# Patient Record
Sex: MALE | Race: WHITE | URBAN - METROPOLITAN AREA
[De-identification: names, ages, dates, MRNs, and addresses within clinical notes are randomized per-mention and may not be internally consistent; named-entity substitution may affect disease eponyms.]

---

## 2017-02-08 ENCOUNTER — HISTORICAL (OUTPATIENT)
Dept: ADMINISTRATIVE | Facility: HOSPITAL | Age: 22
End: 2017-02-08

## 2017-04-07 ENCOUNTER — HISTORICAL (OUTPATIENT)
Dept: SURGERY | Facility: HOSPITAL | Age: 22
End: 2017-04-07

## 2017-07-13 ENCOUNTER — HISTORICAL (OUTPATIENT)
Dept: SURGERY | Facility: HOSPITAL | Age: 22
End: 2017-07-13

## 2020-07-10 ENCOUNTER — HISTORICAL (OUTPATIENT)
Dept: ADMINISTRATIVE | Facility: HOSPITAL | Age: 25
End: 2020-07-10

## 2020-07-13 ENCOUNTER — HISTORICAL (OUTPATIENT)
Dept: ADMINISTRATIVE | Facility: HOSPITAL | Age: 25
End: 2020-07-13

## 2020-07-16 ENCOUNTER — HISTORICAL (OUTPATIENT)
Dept: SURGERY | Facility: HOSPITAL | Age: 25
End: 2020-07-16

## 2020-07-29 ENCOUNTER — HISTORICAL (OUTPATIENT)
Dept: ADMINISTRATIVE | Facility: HOSPITAL | Age: 25
End: 2020-07-29

## 2020-08-12 ENCOUNTER — HISTORICAL (OUTPATIENT)
Dept: ADMINISTRATIVE | Facility: HOSPITAL | Age: 25
End: 2020-08-12

## 2020-09-21 ENCOUNTER — HISTORICAL (OUTPATIENT)
Dept: ADMINISTRATIVE | Facility: HOSPITAL | Age: 25
End: 2020-09-21

## 2020-09-21 LAB
ABS NEUT (OLG): 2.02 X10(3)/MCL (ref 2.1–9.2)
ALBUMIN SERPL-MCNC: 4 GM/DL (ref 3.4–5)
ALBUMIN/GLOB SERPL: 1.2 RATIO (ref 1.1–2)
ALP SERPL-CCNC: 82 UNIT/L (ref 45–117)
ALT SERPL-CCNC: 35 UNIT/L (ref 12–78)
AST SERPL-CCNC: 17 UNIT/L (ref 15–37)
BASOPHILS # BLD AUTO: 0 X10(3)/MCL (ref 0–0.2)
BASOPHILS NFR BLD AUTO: 1 %
BILIRUB SERPL-MCNC: 0.4 MG/DL (ref 0.2–1)
BILIRUBIN DIRECT+TOT PNL SERPL-MCNC: 0.1 MG/DL (ref 0–0.2)
BILIRUBIN DIRECT+TOT PNL SERPL-MCNC: 0.3 MG/DL
BUN SERPL-MCNC: 15 MG/DL (ref 7–18)
CALCIUM SERPL-MCNC: 8.9 MG/DL (ref 8.5–10.1)
CHLORIDE SERPL-SCNC: 109 MMOL/L (ref 98–107)
CHOLEST SERPL-MCNC: 173 MG/DL
CHOLEST/HDLC SERPL: 3.2 {RATIO} (ref 0–5)
CO2 SERPL-SCNC: 28 MMOL/L (ref 21–32)
CREAT SERPL-MCNC: 0.9 MG/DL (ref 0.6–1.3)
EOSINOPHIL # BLD AUTO: 0.1 X10(3)/MCL (ref 0–0.9)
EOSINOPHIL NFR BLD AUTO: 2 %
ERYTHROCYTE [DISTWIDTH] IN BLOOD BY AUTOMATED COUNT: 12.1 % (ref 11.5–14.5)
EST. AVERAGE GLUCOSE BLD GHB EST-MCNC: 140 MG/DL
ESTRADIOL SERPL HS-MCNC: <24 PG/ML
FSH SERPL-ACNC: 2.5 MIU/ML (ref 1.5–15)
GLOBULIN SER-MCNC: 3.3 GM/ML (ref 2.3–3.5)
GLUCOSE SERPL-MCNC: 88 MG/DL (ref 74–100)
HBA1C MFR BLD: 6.5 % (ref 4.2–6.3)
HCT VFR BLD AUTO: 45.8 % (ref 40–51)
HDLC SERPL-MCNC: 54 MG/DL (ref 40–59)
HGB BLD-MCNC: 15.2 GM/DL (ref 13.5–17.5)
LDLC SERPL CALC-MCNC: 102 MG/DL
LH SERPL-ACNC: 4.2 MIU/ML (ref 1.2–10.6)
LYMPHOCYTES # BLD AUTO: 1.3 X10(3)/MCL (ref 0.6–4.6)
LYMPHOCYTES NFR BLD AUTO: 35 %
MCH RBC QN AUTO: 32.2 PG (ref 26–34)
MCHC RBC AUTO-ENTMCNC: 33.2 GM/DL (ref 31–37)
MCV RBC AUTO: 97 FL (ref 80–100)
MONOCYTES # BLD AUTO: 0.3 X10(3)/MCL (ref 0.1–1.3)
MONOCYTES NFR BLD AUTO: 9 %
NEUTROPHILS # BLD AUTO: 2.02 X10(3)/MCL (ref 2.1–9.2)
NEUTROPHILS NFR BLD AUTO: 53 %
PLATELET # BLD AUTO: 181 X10(3)/MCL (ref 130–400)
PMV BLD AUTO: 11.8 FL (ref 7.4–10.4)
POTASSIUM SERPL-SCNC: 4 MMOL/L (ref 3.5–5.1)
PROT SERPL-MCNC: 7.3 GM/DL (ref 6.4–8.2)
PSA SERPL-MCNC: 1.1 NG/ML
RBC # BLD AUTO: 4.72 X10(6)/MCL (ref 4.5–5.9)
SODIUM SERPL-SCNC: 143 MMOL/L (ref 136–145)
TESTOST SERPL-MCNC: 312.69 NG/DL (ref 240.24–870.68)
TRIGL SERPL-MCNC: 83 MG/DL
TSH SERPL-ACNC: 2.03 MIU/L (ref 0.36–3.74)
VLDLC SERPL CALC-MCNC: 17 MG/DL
WBC # SPEC AUTO: 3.8 X10(3)/MCL (ref 4.5–11)

## 2020-09-23 ENCOUNTER — HISTORICAL (OUTPATIENT)
Dept: ADMINISTRATIVE | Facility: HOSPITAL | Age: 25
End: 2020-09-23

## 2020-09-28 ENCOUNTER — HISTORICAL (OUTPATIENT)
Dept: FAMILY MEDICINE | Facility: CLINIC | Age: 25
End: 2020-09-28

## 2020-09-28 LAB — CORTIS SERPL-SCNC: 7.6 UG/DL

## 2020-10-13 ENCOUNTER — HISTORICAL (OUTPATIENT)
Dept: ADMINISTRATIVE | Facility: HOSPITAL | Age: 25
End: 2020-10-13

## 2020-10-13 LAB
BUN SERPL-MCNC: 13 MG/DL (ref 7–18)
CALCIUM SERPL-MCNC: 9.1 MG/DL (ref 8.5–10.1)
CHLORIDE SERPL-SCNC: 106 MMOL/L (ref 98–107)
CO2 SERPL-SCNC: 29 MMOL/L (ref 21–32)
CREAT SERPL-MCNC: 0.9 MG/DL (ref 0.6–1.3)
CREAT/UREA NIT SERPL: 14 MG/DL (ref 12–14)
GLUCOSE SERPL-MCNC: 81 MG/DL (ref 74–106)
POTASSIUM SERPL-SCNC: 3.8 MMOL/L (ref 3.5–5.1)
SODIUM SERPL-SCNC: 141 MMOL/L (ref 136–145)
T4 FREE SERPL-MCNC: 0.98 NG/DL (ref 0.76–1.46)
TESTOST SERPL-MCNC: 278.68 NG/DL (ref 240.24–870.68)
TSH SERPL-ACNC: 1.4 MIU/L (ref 0.36–3.74)

## 2021-01-27 ENCOUNTER — HISTORICAL (OUTPATIENT)
Dept: ADMINISTRATIVE | Facility: HOSPITAL | Age: 26
End: 2021-01-27

## 2021-01-27 LAB — SARS-COV-2 RNA RESP QL NAA+PROBE: NOT DETECTED

## 2021-01-29 ENCOUNTER — HISTORICAL (OUTPATIENT)
Dept: SURGERY | Facility: HOSPITAL | Age: 26
End: 2021-01-29

## 2021-03-02 ENCOUNTER — HISTORICAL (OUTPATIENT)
Dept: ENDOCRINOLOGY | Facility: CLINIC | Age: 26
End: 2021-03-02

## 2021-03-02 LAB
ABS NEUT (OLG): 2.89 X10(3)/MCL (ref 2.1–9.2)
ALBUMIN SERPL-MCNC: 4.3 GM/DL (ref 3.5–5)
ALBUMIN/GLOB SERPL: 1.5 RATIO (ref 1.1–2)
ALP SERPL-CCNC: 94 UNIT/L (ref 40–150)
ALT SERPL-CCNC: 29 UNIT/L (ref 0–55)
AST SERPL-CCNC: 20 UNIT/L (ref 5–34)
BASOPHILS # BLD AUTO: 0 X10(3)/MCL (ref 0–0.2)
BASOPHILS NFR BLD AUTO: 1 %
BILIRUB SERPL-MCNC: 0.7 MG/DL
BILIRUBIN DIRECT+TOT PNL SERPL-MCNC: 0.2 MG/DL (ref 0–0.5)
BILIRUBIN DIRECT+TOT PNL SERPL-MCNC: 0.5 MG/DL (ref 0–0.8)
BUN SERPL-MCNC: 12.3 MG/DL (ref 8.9–20.6)
CALCIUM SERPL-MCNC: 9 MG/DL (ref 8.4–10.2)
CHLORIDE SERPL-SCNC: 104 MMOL/L (ref 98–107)
CHOLEST SERPL-MCNC: 190 MG/DL
CHOLEST/HDLC SERPL: 4 {RATIO} (ref 0–5)
CO2 SERPL-SCNC: 31 MMOL/L (ref 22–29)
CREAT SERPL-MCNC: 1.07 MG/DL (ref 0.73–1.18)
EOSINOPHIL # BLD AUTO: 0.1 X10(3)/MCL (ref 0–0.9)
EOSINOPHIL NFR BLD AUTO: 2 %
ERYTHROCYTE [DISTWIDTH] IN BLOOD BY AUTOMATED COUNT: 12.7 % (ref 11.5–14.5)
EST. AVERAGE GLUCOSE BLD GHB EST-MCNC: 88.2 MG/DL
GLOBULIN SER-MCNC: 2.8 GM/DL (ref 2.4–3.5)
GLUCOSE SERPL-MCNC: 85 MG/DL (ref 74–100)
HAV IGM SERPL QL IA: NONREACTIVE
HBA1C MFR BLD: 4.7 %
HBV CORE IGM SERPL QL IA: NONREACTIVE
HBV SURFACE AG SERPL QL IA: NONREACTIVE
HCT VFR BLD AUTO: 48.3 % (ref 40–51)
HCV AB SERPL QL IA: NONREACTIVE
HDLC SERPL-MCNC: 49 MG/DL (ref 35–60)
HGB BLD-MCNC: 15.6 GM/DL (ref 13.5–17.5)
HIV 1+2 AB+HIV1 P24 AG SERPL QL IA: NONREACTIVE
IMM GRANULOCYTES # BLD AUTO: 0.01 10*3/UL
IMM GRANULOCYTES NFR BLD AUTO: 0 %
LDLC SERPL CALC-MCNC: 126 MG/DL (ref 50–140)
LYMPHOCYTES # BLD AUTO: 1 X10(3)/MCL (ref 0.6–4.6)
LYMPHOCYTES NFR BLD AUTO: 24 %
MCH RBC QN AUTO: 31.1 PG (ref 26–34)
MCHC RBC AUTO-ENTMCNC: 32.3 GM/DL (ref 31–37)
MCV RBC AUTO: 96.4 FL (ref 80–100)
MONOCYTES # BLD AUTO: 0.4 X10(3)/MCL (ref 0.1–1.3)
MONOCYTES NFR BLD AUTO: 9 %
NEUTROPHILS # BLD AUTO: 2.89 X10(3)/MCL (ref 2.1–9.2)
NEUTROPHILS NFR BLD AUTO: 65 %
PLATELET # BLD AUTO: 188 X10(3)/MCL (ref 130–400)
PMV BLD AUTO: 11.3 FL (ref 7.4–10.4)
POTASSIUM SERPL-SCNC: 4.5 MMOL/L (ref 3.5–5.1)
PROT SERPL-MCNC: 7.1 GM/DL (ref 6.4–8.3)
RBC # BLD AUTO: 5.01 X10(6)/MCL (ref 4.5–5.9)
SODIUM SERPL-SCNC: 141 MMOL/L (ref 136–145)
T PALLIDUM AB SER QL: NONREACTIVE
TESTOST SERPL-MCNC: >1500 NG/DL (ref 240.24–870.68)
TRIGL SERPL-MCNC: 75 MG/DL (ref 34–140)
VLDLC SERPL CALC-MCNC: 15 MG/DL
WBC # SPEC AUTO: 4.4 X10(3)/MCL (ref 4.5–11)

## 2021-04-03 ENCOUNTER — HISTORICAL (OUTPATIENT)
Dept: LAB | Facility: HOSPITAL | Age: 26
End: 2021-04-03

## 2021-04-03 LAB
ABS NEUT (OLG): 3.3 X10(3)/MCL (ref 2.1–9.2)
BASOPHILS # BLD AUTO: 0 X10(3)/MCL (ref 0–0.2)
BASOPHILS NFR BLD AUTO: 1 %
EOSINOPHIL # BLD AUTO: 0.1 X10(3)/MCL (ref 0–0.9)
EOSINOPHIL NFR BLD AUTO: 1 %
ERYTHROCYTE [DISTWIDTH] IN BLOOD BY AUTOMATED COUNT: 12.4 % (ref 11.5–14.5)
HCT VFR BLD AUTO: 49.7 % (ref 40–51)
HGB BLD-MCNC: 16.3 GM/DL (ref 13.5–17.5)
IMM GRANULOCYTES # BLD AUTO: 0.01 10*3/UL
IMM GRANULOCYTES NFR BLD AUTO: 0 %
LYMPHOCYTES # BLD AUTO: 1.2 X10(3)/MCL (ref 0.6–4.6)
LYMPHOCYTES NFR BLD AUTO: 23 %
MCH RBC QN AUTO: 31.7 PG (ref 26–34)
MCHC RBC AUTO-ENTMCNC: 32.8 GM/DL (ref 31–37)
MCV RBC AUTO: 96.7 FL (ref 80–100)
MONOCYTES # BLD AUTO: 0.5 X10(3)/MCL (ref 0.1–1.3)
MONOCYTES NFR BLD AUTO: 10 %
NEUTROPHILS # BLD AUTO: 3.3 X10(3)/MCL (ref 2.1–9.2)
NEUTROPHILS NFR BLD AUTO: 64 %
PLATELET # BLD AUTO: 185 X10(3)/MCL (ref 130–400)
PMV BLD AUTO: 10.8 FL (ref 7.4–10.4)
RBC # BLD AUTO: 5.14 X10(6)/MCL (ref 4.5–5.9)
TESTOST SERPL-MCNC: 207 NG/DL (ref 240.24–870.68)
WBC # SPEC AUTO: 5.1 X10(3)/MCL (ref 4.5–11)

## 2021-04-13 ENCOUNTER — HISTORICAL (OUTPATIENT)
Dept: ADMINISTRATIVE | Facility: HOSPITAL | Age: 26
End: 2021-04-13

## 2021-04-13 LAB
APPEARANCE, UA: ABNORMAL
BACTERIA #/AREA URNS AUTO: ABNORMAL /HPF
BILIRUB UR QL STRIP: NEGATIVE
COLOR UR: ABNORMAL
GLUCOSE (UA): NEGATIVE
HGB UR QL STRIP: NEGATIVE
HYALINE CASTS #/AREA URNS LPF: ABNORMAL /LPF
KETONES UR QL STRIP: NEGATIVE
LEUKOCYTE ESTERASE UR QL STRIP: 250 LEU/UL
NITRITE UR QL STRIP: NEGATIVE
PH UR STRIP: 7.5 [PH] (ref 4.5–8)
PROT UR QL STRIP: NEGATIVE
PSA SERPL-MCNC: 1.07 NG/ML
RBC #/AREA URNS AUTO: ABNORMAL /HPF
SP GR UR STRIP: 1.01 (ref 1–1.03)
SQUAMOUS #/AREA URNS LPF: ABNORMAL /LPF
UROBILINOGEN UR STRIP-ACNC: NORMAL
WBC #/AREA URNS AUTO: ABNORMAL /HPF

## 2021-04-16 LAB — FINAL CULTURE: NO GROWTH

## 2021-05-21 ENCOUNTER — HISTORICAL (OUTPATIENT)
Dept: RADIOLOGY | Facility: HOSPITAL | Age: 26
End: 2021-05-21

## 2021-05-21 LAB — CREAT SERPL-MCNC: 1.05 MG/DL (ref 0.73–1.18)

## 2022-04-10 ENCOUNTER — HISTORICAL (OUTPATIENT)
Dept: ADMINISTRATIVE | Facility: HOSPITAL | Age: 27
End: 2022-04-10

## 2022-04-27 VITALS
WEIGHT: 166.88 LBS | BODY MASS INDEX: 24.72 KG/M2 | DIASTOLIC BLOOD PRESSURE: 82 MMHG | OXYGEN SATURATION: 97 % | SYSTOLIC BLOOD PRESSURE: 119 MMHG | HEIGHT: 69 IN

## 2022-04-30 NOTE — OP NOTE
DATE OF SURGERY:    07/13/2017    SURGEON:  Ivan Morgan Jr, MD    PREOPERATIVE DIAGNOSIS:  Right leg exertional compartment syndrome.    POSTOPERATIVE DIAGNOSIS:  Right leg exertional compartment syndrome.    PROCEDURE:  Right endoscopic assisted compartment release, four compartments.    ASSISTANT:  None.    ANESTHESIA:  General plus local.    COMPLICATIONS:  None.    TOURNIQUET TIME:  40 minutes.    HISTORY OF PRESENT ILLNESS:  This is a very pleasant, 22-year-old who has had exertional compartment syndrome of bilateral lower extremities.  He underwent a left compartment release about 3 months ago with good results.  He returned with continued symptoms on the right side and wanted to undergo release on the right.  I discussed with him the risks, benefits and alternatives to therapy and he elected to proceed.    PROCEDURE IN DETAIL:  The patient was initially seen on preoperative unit where history and physical were reviewed without change.  His right leg was marked.  Consents were reviewed.  All questions were answered.  He was taken to the Operating Room and placed supine on the operating table where general anesthesia was induced.  His right lower extremity was prepped and draped in a sterile fashion.  Attending led timeout confirmed the operative site.  Preoperative antibiotics were administered.  I then began the procedure.       I insufflated the tourniquet and started on lateral side.  I first made a matching incision approximately 15 cm proximal to the tip of the fibula.  I sharply incised through the skin and gently spread through subcutaneous tissue, identified the superficial peroneal nerve and freed this from its sheath.  I retracted it gently and posteriorly incised the fascia  the anterior and lateral compartment.  I then made a cross butler in the anterior compartment as well as the lateral compartment while moving the superficial peroneal laterally then anteriorly.  Under direct  visualization, I was able to extend the fascia via the anterior compartment first distally.  As I got past direct visualization, I used the 2.9 mm endoscope in order to visualize the fascia and the nerve in order to protect it.  I did a similar move distally on the lateral side.  I then turned my attention proximally and again under direct visualization was able to split the fascia in anterior compartment of the leg.  I similarly did the lateral side and used the endoscope as needed.  I then made a secondary incision proximal and anterior to the fibula.  I again sharply incised through the skin and subcutaneous tissue.  I identified the intramuscular septum and  the anterior and lateral compartment.  I split this transversely and extended again across anterior compartment and lateral compartment.  I again used scissors under direct visualization with the end of the endoscope to connect my two fasciotomies anterior and lateral.  I then turned my attention proximal and extended up proximally.  I then turned my attention to the medial side.  I used a single incision in the middle portion of the calf.  I sharply incised through skin.  I gently spread the subcutaneous tissue and identified the saphenous neurovascular bundle.  I retracted this posteriorly.  I then incised through the superficial and posterior compartment under direct visualization distal and proximal as far as I could see.  I again used the assistance of endoscope in order to visualize further.  I used key elevator to retract the superficial posterior compartment posteriorly and I was able to open up the deep posterior compartment.  I again extended this both proximally and distally.  I was happy with the decompression.  I copiously irrigated the wounds, closed the deep layer with 2-0 Vicryl and subcutaneous layer with 3-0 Monocryl.  He was placed into a sterile dressing and into his 3-D boot.        ______________________________  Ivan Morgan  Jr, MD    BEE/WILLIAN  DD:  07/13/2017  Time:  09:32AM  DT:  07/13/2017  Time:  02:03PM  Job #:  [id number]
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
## 2022-05-03 NOTE — HISTORICAL [facility] CERNER
This is a historical note converted from Елена. Formatting and pictures may have been removed.  Please reference Елена for original formatting and attached multimedia. Chief Complaint  new referral, low testosterone; ?fatigue, decreased libido, decreased appetite, and decreased weight (last 10-15 lbs in 6 months)  History of Present Illness  10/13/2020 Endocrine Clinic Note: 25 year old male scheduled today as New patient referral for Low testosterone/ Male Gynecomastia. Patient was diagnosed with male gynecomastia in 2016 and was referred?[facility] for mammogram and he was to have a follow-up with the physician for further testing and did not follow-up.? Ultrasound of breast on 2/14/2016 no malignancy is noted, asymmetrical bilateral gynecomastia significant more prominent on the right.? 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 ?Patient reports?for the last?1-1/2 years he has had increased fatigue,?low sex drive,?decreased muscle mass?loss of 20 pounds?over the last?3 years but 10 pounds in the last?2 to 3 months.? Patient also had a TSH?recently of 2.030.? He also had an A1c of 6.5?patient states that he does not drink soda?or energy drinks?occasionally overeats sweets?but nothing in excess?and was surprised to see his A1c?was 6.5.? Patient denies any autoimmune?diseases that are known in his family no type 1 diabetes?no Hashimotos?or autoimmune disorders.  Review of Systems  General:??weight lose,?health good, No fever or chills. ?Yes severe fatigue  Skin: No rashes, No itching, No color changes, No abnormal moles  Eyes: No glasses or contacts, No vision loss, No blurring, No floaters, No diplopia  Ears/Nose:?No decreased hearing,  No?tinnitus, No rhinorrhea, No sinus pressure, No congestion ?  Mouth/Throat: No hoarseness or sore throat, No dysphagia, No oral ulcer, No dental problems  Respiratory: No dyspnea on exertion or rest, No Pleuritic pain, No cough, No Sputum  Cardiovascular: No Chest pain, No Palpitations, No Edema, No orthopnea  Gastrointestinal: Good appetite, No regurgitation, No Nausea. No?Vomiting, No Bloating, No?diarrhea or?Constipation ??  Genitourinary:? No pain, No Urgency, No hematuria, No urinary incontinence, No discharge, No decreased stream. No BPH, No?Nocturia or frequency ?  Musculoskeletal: No joint pain (hands/elbow/shoulder/hips/knees/feet) No am stiffness, No back or neck pain  Hematologic: No Sickle cell, No easy bleeding, No excessive bruising, No pallor  Neurological: No Seizures, No Headache, No memory loss, No tremors, No LOC, ADHD  Psychiatric: No Depression, No anxiety, No Panic, No hallucinations, No tearfulness  Sleep: No Insomnia, No snoring, No apneas, No hx of CPAP or Bipap  Physical Exam  Vitals & Measurements  T:?36.6? ?C (Oral)? HR:?77(Peripheral)? RR:?20? BP:?127/81?  HT:?176.00?cm? WT:?73.100?kg? BMI:?23.6?  General:? Alert and oriented, No acute distress, General health good  Eye:? Pupils are equal, round and reactive to light, Normal conjunctiva.?  HENT:? Normocephalic, Normal hearing, Oral mucosa is moist, No pharyngeal erythema.?  Neck:? Supple, Non-tender, No carotid bruit, No jugular venous distention, No lymphadenopathy, No thyromegaly.?  Respiratory:? Lungs are clear to auscultation, Respirations are non-labored, Breath sounds are equal, Symmetrical chest wall expansion.?  Cardiovascular:? Normal rate, Regular rhythm, No murmur, Good pulses equal in all extremities, Normal peripheral perfusion, No edema.?  Gastrointestinal:? Soft, Non-tender, Non-distended, Normal bowel sounds, No organomegaly.?  Genitourinary:? No costovertebral angle tenderness.?  Lymphatics:? No lymphadenopathy neck,  axilla, groin.?  Musculoskeletal: Normal range of motion, Normal strength, No tenderness, No deformity, Normal gait.?  Integumentary:? Warm, Dry.?  Neurologic:? Alert, Oriented.?  Psychiatric:? Cooperative, Appropriate mood & affect.?  ?  ?  ?  Assessment/Plan  Fatigue?R53.83  Thyroid work-up r/o Hash  Treat testosterone level  Ordered:  Basic Metabolic Panel, Routine collect, *Est. 10/13/20 3:00:00 CDT, Blood, Order for future visit, *Est. Stop date 10/13/20 3:00:00 CDT, Lab Collect, Insulin resistance  Fatigue  Low testosterone in male, 10/13/20 9:52:00 CDT  Clinic Follow up, *Est. 04/13/21 3:00:00 CDT, 6 month F/U, In Clinic, Order for future visit, Low testosterone in male  Fatigue  Insulin resistance  Low libido  Gynecomastia, male, [facility]  Clinic Follow up, *Est. 04/13/21 3:00:00 CDT, 6 month F/U, Order for future visit, Low testosterone in male  Fatigue  Insulin resistance  ED (erectile dysfunction)  Gynecomastia, male, [facility]  Free T4, Routine collect, *Est. 10/13/20 3:00:00 CDT, Blood, Order for future visit, *Est. Stop date 10/13/20 3:00:00 CDT, Lab Collect, Low testosterone in male  Fatigue, 10/13/20 9:24:00 CDT  Testosterone Level Total, Routine collect, *Est. 10/13/20 3:00:00 CDT, Blood, Order for future visit, *Est. Stop date 10/13/20 3:00:00 CDT, Lab Collect, Low testosterone in male  Fatigue, 10/13/20 9:24:00 CDT  Thyroid Peroxidase (TPO), Routine collect, *Est. 10/13/20 3:00:00 CDT, Blood, Order for future visit, *Est. Stop date 10/13/20 3:00:00 CDT, Lab Collect, Low testosterone in male  Fatigue, 10/13/20 9:24:00 CDT  Thyroid Stimulating Hormone, Routine collect, *Est. 10/13/20 3:00:00 CDT, Blood, Order for future visit, *Est. Stop date 10/13/20 3:00:00 CDT, Lab Collect, Low testosterone in male  Fatigue, 10/13/20 9:24:00 CDT  ?  Gynecomastia, male?N62  Likely DHEA/OTC steroid use in past  Ultrasound?2016 no malignancy  Ordered:  Clinic Follow up, *Est. 04/13/21  3:00:00 CDT, 6 month F/U, In Clinic, Order for future visit, Low testosterone in male  Fatigue  Insulin resistance  Low libido  Gynecomastia, male, [facility]  Clinic Follow up, *Est. 04/13/21 3:00:00 CDT, 6 month F/U, Order for future visit, Low testosterone in male  Fatigue  Insulin resistance  ED (erectile dysfunction)  Gynecomastia, male, [facility]  ?  Insulin resistance?E88.81  C-peptid, XENA r/o honeymoon autoimmune burnout  Ordered:  Basic Metabolic Panel, Routine collect, *Est. 10/13/20 3:00:00 CDT, Blood, Order for future visit, *Est. Stop date 10/13/20 3:00:00 CDT, Lab Collect, Insulin resistance  Fatigue  Low testosterone in male, 10/13/20 9:52:00 CDT  C-Peptide, Serum-[facility] [id number], Routine collect, *Est. 10/13/20 3:00:00 CDT, Blood, Order for future visit, *Est. Stop date 10/13/20 3:00:00 CDT, Lab Collect, Insulin resistance, 10/13/20 9:23:00 CDT  Clinic Follow up, *Est. 04/13/21 3:00:00 CDT, 6 month F/U, In Clinic, Order for future visit, Low testosterone in male  Fatigue  Insulin resistance  Low libido  Gynecomastia, male, [facility]  Clinic Follow up, *Est. 04/13/21 3:00:00 CDT, 6 month F/U, Order for future visit, Low testosterone in male  Fatigue  Insulin resistance  ED (erectile dysfunction)  Gynecomastia, male, [facility]  Glutamic Acid Decarboxylase Antibody-[facility] [id number], Routine collect, *Est. 10/13/20 3:00:00 CDT, Blood, Order for future visit, *Est. Stop date 10/13/20 3:00:00 CDT, Lab Collect, Insulin resistance, 10/13/20 9:24:00 CDT  ?  Low libido?R68.82  Cialis as needed until testosterone is improved  Take 1/2 tablet as needed  Ordered:  Clinic Follow up, *Est. 04/13/21 3:00:00 CDT, 6 month F/U, In Clinic, Order for future visit, Low testosterone in male  Fatigue  Insulin resistance  Low libido  Gynecomastia, male, [facility]  ?  Low testosterone in male?R79.89  Repeat AM testosterone and DHEA  Start Testosterone once 2nd level  obtained  LH 4.2, FSH 2.5, estradiol level?< 24  Labs today TSH, Free T4, TPO,?C-peptid, BMP, XENA, testosterone level  RTC 6 months  Ordered:  Basic Metabolic Panel, Routine collect, *Est. 10/13/20 3:00:00 CDT, Blood, Order for future visit, *Est. Stop date 10/13/20 3:00:00 CDT, Lab Collect, Insulin resistance  Fatigue  Low testosterone in male, 10/13/20 9:52:00 CDT  Clinic Follow up, *Est. 04/13/21 3:00:00 CDT, 6 month F/U, In Clinic, Order for future visit, Low testosterone in male  Fatigue  Insulin resistance  Low libido  Gynecomastia, male, [facility]  Clinic Follow up, *Est. 04/13/21 3:00:00 CDT, 6 month F/U, Order for future visit, Low testosterone in male  Fatigue  Insulin resistance  ED (erectile dysfunction)  Gynecomastia, male, [facility]  Free T4, Routine collect, *Est. 10/13/20 3:00:00 CDT, Blood, Order for future visit, *Est. Stop date 10/13/20 3:00:00 CDT, Lab Collect, Low testosterone in male  Fatigue, 10/13/20 9:24:00 CDT  Testosterone Level Total, Routine collect, *Est. 10/13/20 3:00:00 CDT, Blood, Order for future visit, *Est. Stop date 10/13/20 3:00:00 CDT, Lab Collect, Low testosterone in male  Fatigue, 10/13/20 9:24:00 CDT  Thyroid Peroxidase (TPO), Routine collect, *Est. 10/13/20 3:00:00 CDT, Blood, Order for future visit, *Est. Stop date 10/13/20 3:00:00 CDT, Lab Collect, Low testosterone in male  Fatigue, 10/13/20 9:24:00 CDT  Thyroid Stimulating Hormone, Routine collect, *Est. 10/13/20 3:00:00 CDT, Blood, Order for future visit, *Est. Stop date 10/13/20 3:00:00 CDT, Lab Collect, Low testosterone in male  Fatigue, 10/13/20 9:24:00 CDT  ?  Orders:  tadalafil, 20 mg = 1 tab(s), Oral, Daily, # 10 tab(s), 0 Refill(s), Pharmacy: [facility], 176, cm, Height/Length Dosing, 10/13/20 9:05:00 CDT, 73.1, kg, Weight Dosing, 10/13/20 9:05:00 CDT   Problem List/Past Medical History  Ongoing  Closed fracture of middle phalanx of ring finger of right  hand  Exertional compartment syndrome  Gynecomastia, male  Historical  No qualifying data  Procedure/Surgical History  ORIF Finger (Right) (07/16/2020)  Percutaneous skeletal fixation of unstable phalangeal shaft fracture, proximal or middle phalanx, finger or thumb, with manipulation, each (07/16/2020)  Reposition Right Finger Phalanx with Internal Fixation Device, Percutaneous Approach (07/16/2020)  Decompression fasciotomy, leg; anterior and/or lateral, and posterior compartment(s) (07/13/2017)  Fasciotomy Endoscopic (Right) (07/13/2017)  Release Right Lower Leg Muscle, Open Approach (07/13/2017)  Decompression fasciotomy, leg; anterior and/or lateral, and posterior compartment(s) (04/07/2017)  Fasciotomy (Left) (04/07/2017)  Fasciotomy Endoscopic (Left) (04/07/2017)  Release Left Lower Leg Muscle, Open Approach (04/07/2017)  Fracture of mandible (01/01/2014)  Congenital varus club-foot (1996)  Broken bone   Medications  No active medications  Allergies  penicillins  sulfa drugs  Social History  Abuse/Neglect  No, No, Yes, 10/01/2020  Alcohol  Never, 11/15/2016  Employment/School  Employed, 11/15/2016  Exercise  Exercise duration: 60. Exercise type: Walking, Weight lifting., 11/15/2016  Financial/Legal Situation  None, 09/18/2020  Home/Environment  Lives with Alone., 11/15/2016    [employer], Non-[employer] duty, 09/18/2020  Nutrition/Health  Type of diet: Regular Diet. Regular, 11/15/2016  Sexual  Gender Identity Identifies as male., 08/12/2020  Sexually active: Yes., 11/15/2016  Spiritual/Cultural  None, 09/18/2020  Substance Use  Never, 11/15/2016  Tobacco  Never (less than 100 in lifetime), N/A, 10/01/2020  Family History  Hypertension.: Mother.  Immunizations  Vaccine Date Status   influenza virus vaccine, inactivated 10/01/2020 Given   tetanus/diphtheria/pertussis, acel(Tdap) 09/18/2020 Given   influenza virus vaccine, inactivated 10/05/2013 Recorded   human papillomavirus vaccine 12/18/2012  Recorded   human papillomavirus vaccine 08/16/2012 Recorded   meningococcal conjugate vaccine 08/16/2012 Recorded   human papillomavirus vaccine 06/05/2012 Recorded   influenza virus vaccine, live, trivalent 10/06/2011 Recorded   hepatitis A pediatric vaccine 03/05/2008 Recorded   varicella virus vaccine 07/18/2007 Recorded   tetanus/diphtheria/pertussis, acel(Tdap) 07/18/2007 Recorded   meningococcal conjugate vaccine 07/18/2007 Recorded   hepatitis A pediatric vaccine 07/18/2007 Recorded   poliovirus vaccine, inactivated 10/02/2000 Recorded   measles/mumps/rubella virus vaccine 10/02/2000 Recorded   varicella virus vaccine 04/23/1997 Recorded   measles/mumps/rubella virus vaccine 04/23/1997 Recorded   poliovirus vaccine, live, trivalent 1996 Recorded   hepatitis B pediatric vaccine 1996 Recorded   poliovirus vaccine, live, trivalent 1996 Recorded   poliovirus vaccine, live, trivalent 1996 Recorded   hepatitis B pediatric vaccine 1996 Recorded   hepatitis B pediatric vaccine 1996 Recorded   Health Maintenance  Health Maintenance  ???Pending?(in the next year)  ??? ??OverDue  ??? ? ? ?Influenza Vaccine due??10/01/19??and every 1??day(s)  ??? ??Due In Future?  ??? ? ? ?Obesity Screening not due until??01/01/21??and every 1??year(s)  ??? ? ? ?Alcohol Misuse Screening not due until??01/02/21??and every 1??year(s)  ??? ? ? ?Depression Screening not due until??09/23/21??and every 1??year(s)  ??? ? ? ?Blood Pressure Screening not due until??10/01/21??and every 1??year(s)  ??? ? ? ?Body Mass Index Check not due until??10/01/21??and every 1??year(s)  ??? ? ? ?ADL Screening not due until??10/01/21??and every 1??year(s)  ???Satisfied?(in the past 1 year)  ??? ??Satisfied?  ??? ? ? ?ADL Screening on??10/01/20.??Satisfied by Michelle, April  ??? ? ? ?Alcohol Misuse Screening on??10/01/20.??Satisfied by Michelle, April  ??? ? ? ?Blood Pressure Screening on??10/01/20.??Satisfied by Michelle  April  ??? ? ? ?Body Mass Index Check on??10/01/20.??Satisfied by Michelle, April  ??? ? ? ?Depression Screening on??09/23/20.??Satisfied by Paris FIGUEROA, Summer  ??? ? ? ?Influenza Vaccine on??10/01/20.??Satisfied by Michelle, April  ??? ? ? ?Obesity Screening on??10/01/20.??Satisfied by Michelle, April  ??? ? ? ?Tetanus Vaccine on??09/18/20.??Satisfied by Michelle, April  ?  Diagnostic Results  (12/14/2016 10:27 CST US Breast Right Complete)  * Final Report *  ?  Reason For Exam  Unspecified lump in breast n63;Breast Mass  ?  Radiology Report  BILATERAL BREAST ULTRASOUND  ?  HISTORY: 21-year-old male patient presenting with a palpable lump in  the right breast.  ?  PROCEDURE: ?Ultrasound evaluation was performed of bilateral breasts,  and static and cine images were submitted to PACS for review.  ?  FINDINGS:?  ?  RIGHT BREAST  ?  Ultrasound evaluation was performed of the right breast, with  attention to the area of palpable concern in the retroareolar right  breast. ?  ?  In the area of palpable concern in the retroareolar right breast,  there is flame-shaped hypoechoic fibroglandular breast tissue  radiating from the posterior aspect of the nipple towards the chest  wall. ?This area of hypoechoic fibroglandular tissue measures roughly  4.3 cm x 0.9 cm x 3.8 cm. ?Sonographic findings are consistent with  gynecomastia.  ?  No concerning solid or complex cystic abnormality is identified in the  right breast.  ?  ?  LEFT BREAST  ?  Ultrasound evaluation of the left breast demonstrates no concerning  solid or complex cystic abnormality. ?Minimal hypoechoic  fibroglandular breast tissue is noted posterior to the left nipple,  consistent with gynecomastia.  ?  ?  IMPRESSION: ?  ?  1. ?No sonographic evidence of malignancy.\   2. ?Asymmetric bilateral gynecomastia, significantly more prominent on  the right.  ?  BIRADS Category 2: Benign.  ?  RECOMMENDATION: Follow-up is recommended on a clinical basis.  ?  I  discussed the above findings and recommendations with the patient. [1]     [1]?US Breast Right Complete; Leonie ESCOBAR, Laura CALL. 12/14/2016 10:27 CST

## 2022-05-03 NOTE — HISTORICAL [facility] CERNER
This is a historical note converted from Елена. Formatting and pictures may have been removed.  Please reference Елена for original formatting and attached multimedia. Chief Complaint  hypogonadism 6 month f/u  History of Present Illness  10/13/2020 Endocrine Clinic Note: 25 year old male scheduled today as New patient referral for Low testosterone/ Male Gynecomastia. Patient was diagnosed with male gynecomastia in 2016 and was referred?[facility] for mammogram and he was to have a follow-up with the physician for further testing and did not follow-up.? Ultrasound of breast on 2/14/2016 no malignancy is noted, asymmetrical bilateral gynecomastia significant more prominent on the right.? 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 ?Patient reports?for the last?1-1/2 years he has had increased fatigue,?low sex drive,?decreased muscle mass?loss of 20 pounds?over the last?3 years but 10 pounds in the last?2 to 3 months.? Patient also had a TSH?recently of 2.030.? He also had an A1c of 6.5?patient states that he does not drink soda?or energy drinks?occasionally overeats sweets?but nothing in excess?and was surprised to see his A1c?was 6.5.? Patient denies any autoimmune?diseases that are known in his family no type 1 diabetes?no Hashimotos?or autoimmune disorders. [1]  ?  04/13/2021 Endocrine Clinic note: 25-year-old male scheduled today?for endocrine clinic follow-up?from male?hypogonadism. Patient was recently started on testosterone and was on 180 mg every 2 weeks and will repeat it testosterone level on 3/2/2021 testosterone > 1500.? Patient states at that time testosterone may have been elevated due to taking testosterone that he had previously  bought that does not his prescribed testosterone.? Patient was instructed to hold testosterone x1 week then start a reduced dose of 100 mg to 2 weeks and returns today for testosterone level 207.? Patient reports fatigue,?decreased sex drive?which is reduced dose.? Patient also reports recently?increased?and frequent urination.? Denies any strong odor,?or symptoms of UTI.  Review of Systems  General:??weight stable,?health good, No fever or chills. ?Yes severe fatigue  Skin: No rashes, No itching, No color changes, No abnormal moles  Eyes: No glasses or contacts, No vision loss, No blurring, No floaters, No diplopia  Ears/Nose:?No decreased hearing, No?tinnitus, No rhinorrhea, No sinus pressure, No congestion ?  Mouth/Throat: No hoarseness or sore throat, No dysphagia, No oral ulcer, No dental problems  Respiratory: No dyspnea on exertion or rest, No Pleuritic pain, No cough, No Sputum  Cardiovascular: No Chest pain, No Palpitations, No Edema, No orthopnea  Gastrointestinal: Good appetite, No regurgitation, No Nausea. No?Vomiting, No Bloating, No?diarrhea or?Constipation ??  Genitourinary:? No pain, No Urgency, No hematuria, No urinary incontinence, No discharge, No decreased stream. No BPH, No?Nocturia or frequency ?  Musculoskeletal: No joint pain (hands/elbow/shoulder/hips/knees/feet) No am stiffness, No back or neck pain  Hematologic: No Sickle cell, No easy bleeding, No excessive bruising, No pallor  Neurological: No Seizures, No Headache, No memory loss, No tremors, No LOC, ADHD  Psychiatric: No Depression, No anxiety, No Panic, No hallucinations, No tearfulness  Sleep: No Insomnia, No snoring, No apneas, No hx of CPAP or Bipap  Physical Exam  Vitals & Measurements  T:?37.2? ?C (Oral)? HR:?87(Peripheral)? RR:?20? BP:?127/82?  HT:?175.00?cm? WT:?78.200?kg? BMI:?25.53?  General:? Alert and oriented, No acute distress, General health good  Eye:? Pupils are equal, round and reactive to light, Normal  conjunctiva.?  HENT:? Normocephalic, Normal hearing, Oral mucosa is moist, No pharyngeal erythema.?  Neck:? Supple, Non-tender, No carotid bruit, No jugular venous distention, No lymphadenopathy, No thyromegaly.?  Respiratory:? Lungs are clear to auscultation, Respirations are non-labored, Breath sounds are equal, Symmetrical chest wall expansion.?  Cardiovascular:? Normal rate, Regular rhythm, No murmur, Good pulses equal in all extremities, Normal peripheral perfusion, No edema.?  Gastrointestinal:? Soft, Non-tender, Non-distended, Normal bowel sounds, No organomegaly.?  Genitourinary:? No costovertebral angle tenderness.?  Lymphatics:? No lymphadenopathy neck, axilla, groin.?  Musculoskeletal: Normal range of motion, Normal strength, No tenderness, No deformity, Normal gait.?  Integumentary:? Warm, Dry.?  Neurologic:? Alert, Oriented.?  Psychiatric:? Cooperative, Appropriate mood & affect.?  Assessment/Plan  Frequent urination?R35.0  urinalysis today  Ordered:  Clinic Follow up, *Est. 06/13/21 3:00:00 CDT, 2 month F/U, Order for future visit, Male hypogonadism  Frequent urination, [facility]  Urinalysis with Microscopic if Indicated, Routine collect, Urine, Order for future visit, *Est. 04/13/21 3:00:00 CDT, *Est. Stop date 04/13/21 3:00:00 CDT, Nurse collect, Frequent urination  ?  Male hypogonadism?E29.1  Testosterone level 207  Previous on 180 mg q 2 weeks >1500, ?changed to 100mg q 2 weeks?  Increase?testosterone to 150mg q 2 weeks?  RTC?2 months with testosterone level ?  Ordered:  Clinic Follow up, *Est. 06/13/21 3:00:00 CDT, 2 month F/U, Order for future visit, Male hypogonadism  Frequent urination, [facility]  Prostate Specific Antigen, Routine collect, *Est. 04/13/21 3:00:00 CDT, Blood, Order for future visit, *Est. Stop date 04/13/21 3:00:00 CDT, Lab Collect, Male hypogonadism, 04/13/21 13:52:00 CDT  ?   Problem List/Past Medical History  Ongoing  Closed fracture of middle phalanx of ring  finger of right hand  Exertional compartment syndrome  Gynecomastia, male  Morbid obesity  Historical  No qualifying data  Procedure/Surgical History  Ablation, soft tissue of inferior turbinates, unilateral or bilateral, any method (eg, electrocautery, radiofrequency ablation, or tissue volume reduction); superficial (01/29/2021)  Excision of Nasal Bone, Open Approach (01/29/2021)  Excision of Nasal Turbinate, Via Natural or Artificial Opening Endoscopic (01/29/2021)  Repair of nasal vestibular stenosis (eg,  grafting, lateral nasal wall reconstruction) (01/29/2021)  Septoplasty (None) (01/29/2021)  Supplement Nasal Mucosa and Soft Tissue with Autologous Tissue Substitute, Open Approach (01/29/2021)  Turbinate Reduction (None) (01/29/2021)  ORIF Finger (Right) (07/16/2020)  Percutaneous skeletal fixation of unstable phalangeal shaft fracture, proximal or middle phalanx, finger or thumb, with manipulation, each (07/16/2020)  Reposition Right Finger Phalanx with Internal Fixation Device, Percutaneous Approach (07/16/2020)  Decompression fasciotomy, leg; anterior and/or lateral, and posterior compartment(s) (07/13/2017)  Fasciotomy Endoscopic (Right) (07/13/2017)  Release Right Lower Leg Muscle, Open Approach (07/13/2017)  Decompression fasciotomy, leg; anterior and/or lateral, and posterior compartment(s) (04/07/2017)  Fasciotomy (Left) (04/07/2017)  Fasciotomy Endoscopic (Left) (04/07/2017)  Release Left Lower Leg Muscle, Open Approach (04/07/2017)  Fracture of mandible (01/01/2014)  Congenital varus club-foot (1996)  Broken bone   Medications  fluticasone 50 mcg/inh nasal spray, 2 spray(s), Nasal, Daily, PRN, 6 refills  Syringes for testosterone injection, See Instructions, 2 refills  testosterone cypionate 200 mg/mL intramuscular solution, 180 mg= 180 mg, IM, q2wk,? ?Not Taking per Prescriber: pt states dose chg  Allergies  penicillins?(Hives)  sulfa drugs?(Hives)  Social History  Abuse/Neglect  No,  No, Yes, 03/23/2021  Alcohol  Past, 02/04/2021  Employment/School  Employed, 11/15/2016  Exercise  Exercise duration: 60. Exercise frequency: Daily. Exercise type: biking., 10/13/2020  Financial/Legal Situation  None, 09/18/2020  Home/Environment  Lives with Significant other., 10/13/2020    [employer], Non-[employer] duty, 09/18/2020  Nutrition/Health  Type of diet: Regular Diet. Regular, 11/15/2016  Sexual  Gender Identity Identifies as male., 08/12/2020  Sexually active: Yes., 11/15/2016  Spiritual/Cultural  None, 09/18/2020  Substance Use  Never, 02/04/2021  Never, 11/15/2016  Tobacco  Never (less than 100 in lifetime), N/A, 03/23/2021  Family History  Hypertension.: Mother.  Immunizations  Vaccine Date Status   influenza virus vaccine, inactivated 10/01/2020 Given   tetanus/diphtheria/pertussis, acel(Tdap) 09/18/2020 Given   influenza virus vaccine, inactivated 10/05/2013 Recorded   human papillomavirus vaccine 12/18/2012 Recorded   meningococcal conjugate vaccine 08/16/2012 Recorded   human papillomavirus vaccine 08/16/2012 Recorded   human papillomavirus vaccine 06/05/2012 Recorded   influenza virus vaccine, live, trivalent 10/06/2011 Recorded   hepatitis A pediatric vaccine 03/05/2008 Recorded   varicella virus vaccine 07/18/2007 Recorded   tetanus/diphtheria/pertussis, acel(Tdap) 07/18/2007 Recorded   meningococcal conjugate vaccine 07/18/2007 Recorded   hepatitis A pediatric vaccine 07/18/2007 Recorded   poliovirus vaccine, inactivated 10/02/2000 Recorded   measles/mumps/rubella virus vaccine 10/02/2000 Recorded   varicella virus vaccine 04/23/1997 Recorded   measles/mumps/rubella virus vaccine 04/23/1997 Recorded   poliovirus vaccine, live, trivalent 1996 Recorded   hepatitis B pediatric vaccine 1996 Recorded   poliovirus vaccine, live, trivalent 1996 Recorded   poliovirus vaccine, live, trivalent 1996 Recorded   hepatitis B pediatric vaccine 1996 Recorded   hepatitis  B pediatric vaccine 1996 Recorded   Health Maintenance  Health Maintenance  ???Pending?(in the next year)  ???There are no current recommendations pending  ??? ??Due In Future?  ??? ? ? ?Influenza Vaccine not due until??10/01/21??and every 1??day(s)  ??? ? ? ?Obesity Screening not due until??01/01/22??and every 1??year(s)  ??? ? ? ?Alcohol Misuse Screening not due until??01/02/22??and every 1??year(s)  ??? ? ? ?Diabetes Screening not due until??03/02/22??and every 1??year(s)  ??? ? ? ?ADL Screening not due until??03/10/22??and every 1??year(s)  ??? ? ? ?Blood Pressure Screening not due until??03/23/22??and every 1??year(s)  ??? ? ? ?Body Mass Index Check not due until??03/23/22??and every 1??year(s)  ??? ? ? ?Depression Screening not due until??03/23/22??and every 1??year(s)  ???Satisfied?(in the past 1 year)  ??? ??Satisfied?  ??? ? ? ?ADL Screening on??03/10/21.??Satisfied by Tala Ag LPN  ??? ? ? ?Alcohol Misuse Screening on??03/23/21.??Satisfied by Michelle April  ??? ? ? ?Blood Pressure Screening on??03/23/21.??Satisfied by Michelle April  ??? ? ? ?Body Mass Index Check on??03/23/21.??Satisfied by Michelle April  ??? ? ? ?Depression Screening on??03/23/21.??Satisfied by Michelle April  ??? ? ? ?Diabetes Screening on??03/02/21.??Satisfied by Mustapha Ryan  ??? ? ? ?Influenza Vaccine on??10/01/20.??Satisfied by Michelle April  ??? ? ? ?Obesity Screening on??03/23/21.??Satisfied by Michelle, April  ??? ? ? ?Tetanus Vaccine on??09/18/20.??Satisfied by Michelle, April  ?     [1]?Office Visit Note; Mery KOWALSKI, Zahira Mares 10/13/2020 09:00 CDT
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
## 2022-05-03 NOTE — HISTORICAL [facility] CERNER
This is a historical note converted from Елена. Formatting and pictures may have been removed.  Please reference [employer]ner for original formatting and attached multimedia. Indication for Surgery  Nasal obstruction refractory to medical treatment and inability to treat with closed septoplasty  Preoperative Diagnosis  Nasal obstruction, internal nasal valve stenosis  Postoperative Diagnosis  Same  Operation  Open septorhinoplasty, inferior turbinate reduction, application of  grafts, application of columellar extension graft, lateral osteotomies, dorsal hump removal  Surgeon(s)  OLVIN Duncan MD; BIRDIE Tesfaye MD  Assistant  MARLON Cornell MD  Anesthesia  General  Estimated Blood Loss  50cc  Findings  Internal nasal valve stenosis  Specimen(s)  N/A  Complications  None  Technique  patient was taken to the operating room and general anesthesia was induced. The patient was intubated with an oral endotracheal tube which was secured to the patients chin. The eyes were then covered with tegaderms.  ?  The nose was injected with 10 cc 1% lidocaine with epinephrine. Two afrini soaked pledgets were placed in each nostril. The patient was then prepped with baby shampoo solution and draped in a sterile fashion including a head drape.  ?  The procedure was begun by taking down the inferior turbinates. Afrin soaked pledges were removed, and a pair of?large converse scissors were used to excise the inferior half of the turbinate on the right side. Bipolar electrocautery was used?valerio motta. The procedure was?performed on the contralateral side in a similar fashion. This was all done?under endoscopic visualization. The patient was then turned 90 degrees.  ?  Attention was turned to the open septorhinoplasty part. This part of the procedure was begun?by making an inverted V columellar incision which was connected to marginal incisions using a 15 blade to expose the lower lateral cartilages. The soft tissue was dissected off of the  cartilages and the nasal dorsum using a double prong skin hook and Littler scissors. The soft tissue envelope was then elevated off of the nasal bones with a combination of scissors and q-tip blunt dissection.  ?  A pull rasp was used to take down the dorsal hump until adequately reduced. This left the patient with an open roof deformity with the intention to correct with osteotomies later in the procedure.  ?  The septum was then approached by retracting the lower lateral cartilages inferior and lateral. The mucoperichondrial flaps were elevated starting at the anterior septal angle using a 15 blade and a jon. The piece of deviated cartilage was removed using a 15 blade and a Layer swivel knife. This harvested cartilage was placed in saline for use as a possible graft.  ?  The harvested cartilage was then cut into three strips.  ?  ?The upper lateral cartilages were dissected free of the dorsal septum using a 15 blade. The cut  grafts were placed at the internal nasal valve and held in place with a 27 gauge needle. A 5-0 PDS was used in a horizontal mattress fashion to fix the  grafts in place. The upper lateral cartilages were then suspended to the  grafts in the same fashion.  ?  A columellar strut graft was placed at the caudal aspect of the septum for tip rotation and tip stabilization. This was secured with a 5-0 PDS suture.  ?  An interdomal suture was placed using a 5-0 PDS and the tip was re-suspended using a 4-0 plain gut suture through the medial crura of the lower lateral cartilages.  ?  Lateral ?osteotomies were then performed. An incision was made near the pyriform aperture anterior to the head of the inferior turbinates on both sides and a lateral pocket was dissected with a jon elevator. A curved, guarded osteotome was used to make high-low-high lateral osteotomies bilaterally. A straight 3 mm osteotome was then used to make bilateral fading medial osteotomies via the  open exposure of the nasal bones. The nasal bones were then moved to the midline position with manual pressure.  ?  The nose was then closed using a 6-0 nylon at the columellar incision and 5-0 chromic suture at the mucosal incisions.  ?  Stratton?splints were cut, coated in bacitracin, and secured to the septum using a 2-0 silk suture. The nose was cleaned and the dorsum was coated with mastisol and covered with steri-strips. A thermal splint was cut, placed in hot water and placed over the tape on the nasal dorsum.  ?  The patient was suctioned and returned to anesthesia without any complications.
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
## 2022-05-03 NOTE — HISTORICAL [facility]ER
This is a historical note converted from Елена. Formatting and pictures may have been removed.  Please reference Елена for original formatting and attached multimedia. Chief Complaint  Right ring finger fracture  History of Present Illness  Mr Webster is a 25-year-old male who returns to clinic today follow-up assessment of his right?fourth finger CRP P middle phalanx. ?He is now 2 weeks postop. ?Reports that he bumped the finger once?in the perioperative period?however has been otherwise compliant?with wearing his splint and?nonweightbearing.  Review of Systems  Constitutional:?no fever, fatigue, weakness  Eye:?no vision loss, eye redness, drainage, or pain  ENMT:?no sore throat, ear pain, sinus pain/congestion, nasal congestion/drainage  Respiratory:?no cough, no wheezing, no shortness of breath  Cardiovascular:?no chest pain, no palpitations, no edema  Gastrointestinal:?no nausea, vomiting, or diarrhea. No abdominal pain  ?  ?  Physical Exam  Vitals & Measurements  HT:?175.20?cm? WT:?72.500?kg? BMI:?23.62?  Right hand:?The patient is neurovascularly intact distally in the right hand.? Minimal tenderness palpation at the fracture site.? Pin sites are clean dry and intact no evidence of infection.  Assessment/Plan  Hand fracture, right?S62.91XA  ?Patient will continue to be nonweightbearing for period of 6 weeks from the time of surgery.? Pins will remain in place for another 2 weeks. ?Patient return to clinic in 2 weeks time for repeat clinical examination?and x-ray as well as the pin removal.  ?  Patient requested referral to the family medicine clinic to establish primary care. ?This was referral was sent today.  Ordered:  Clinic Follow up, *Est. 08/12/20 3:00:00 CDT, Order for future visit, Hand fracture, right, [facility]  Post-Op follow-up visit [id number] PC, Hand fracture, right, [facility], 07/29/20 11:11:00 CDT  XR Hand Fourth Digit Right Min 2 Views, Routine, 07/29/20 11:13:00 CDT, None, Stretcher,  Patient Has IV?, Rad Type, Order for future visit, Hand fracture, right, Not Scheduled, 07/29/20 11:13:00 CDT  XR Hand Fourth Digit Right Min 2 Views, Routine, 07/29/20 10:41:00 CDT, None, Stretcher, Rad Type, Hand fracture, right, Not Scheduled, 07/29/20 10:41:00 CDT  ?  Referrals  Ambulatory Referral, Specialty: Family Practice, Reason: Patient wishes to establish primary care, Refer To: Provider Not Specified, [facility], [address] [place] [address]., Start: 07/29/20 11:12:00 CDT, Urgent:, Hand fracture, right  Clinic Follow up, *Est. 08/12/20 3:00:00 CDT, Order for future visit, Hand fracture, right, [facility]   Problem List/Past Medical History  Ongoing  Closed fracture of middle phalanx of ring finger of right hand  Exertional compartment syndrome  Historical  No qualifying data  Procedure/Surgical History  ORIF Finger (Right) (07/16/2020)  Percutaneous skeletal fixation of unstable phalangeal shaft fracture, proximal or middle phalanx, finger or thumb, with manipulation, each (07/16/2020)  Reposition Right Finger Phalanx with Internal Fixation Device, Percutaneous Approach (07/16/2020)  Decompression fasciotomy, leg; anterior and/or lateral, and posterior compartment(s) (07/13/2017)  Fasciotomy Endoscopic (Right) (07/13/2017)  Release Right Lower Leg Muscle, Open Approach (07/13/2017)  Decompression fasciotomy, leg; anterior and/or lateral, and posterior compartment(s) (04/07/2017)  Fasciotomy (Left) (04/07/2017)  Fasciotomy Endoscopic (Left) (04/07/2017)  Release Left Lower Leg Muscle, Open Approach (04/07/2017)  Fracture of mandible (01/01/2014)  Congenital varus club-foot (1996)  Broken bone   Medications  acetaminophen-hydrocodone 325 mg-5 mg oral tablet, 1 tab(s), Oral, q6hr  Allergies  penicillins  sulfa drugs  Social History  Abuse/Neglect  No, No, Yes, 07/29/2020  No, No, Yes, 07/16/2020  No, 07/13/2020  No, No, Yes, 07/10/2020  No, 06/23/2020  Alcohol  Never,  11/15/2016  Employment/School  Employed, 11/15/2016  Exercise  Exercise duration: 60. Exercise type: Walking, Weight lifting., 11/15/2016  Home/Environment  Lives with Alone., 11/15/2016  Nutrition/Health  Type of diet: Regular Diet. Regular, 11/15/2016  Sexual  Sexually active: Yes., 11/15/2016  Substance Use  Never, 11/15/2016  Tobacco  Never (less than 100 in lifetime), No, 07/29/2020  Family History  Hypertension.: Mother.  Health Maintenance  Health Maintenance  ???Pending?(in the next year)  ??? ??OverDue  ??? ? ? ?Alcohol Misuse Screening due??01/02/20??and every 1??year(s)  ??? ??Due?  ??? ? ? ?ADL Screening due??07/29/20??and every 1??year(s)  ??? ? ? ?Influenza Vaccine due??07/29/20??and every?  ??? ? ? ?Tetanus Vaccine due??07/29/20??and every 10??year(s)  ??? ??Due In Future?  ??? ? ? ?Obesity Screening not due until??01/01/21??and every 1??year(s)  ??? ? ? ?Blood Pressure Screening not due until??07/13/21??and every 1??year(s)  ???Satisfied?(in the past 1 year)  ??? ??Satisfied?  ??? ? ? ?Blood Pressure Screening on??07/16/20.??Satisfied by Gabriella Miles  ??? ? ? ?Body Mass Index Check on??07/29/20.??Satisfied by Glory Kruse MA  ??? ? ? ?Depression Screening on??07/29/20.??Satisfied by Glory Kruse MA  ??? ? ? ?Obesity Screening on??07/29/20.??Satisfied by Glory Kruse MA  ?

## 2022-05-03 NOTE — HISTORICAL [facility]
This is a historical note converted from Елена. Formatting and pictures may have been removed.  Please reference Елена for original formatting and attached multimedia. Chief Complaint  Follow up from Surgery on Ring Finger  History of Present Illness  Surgeries:  7/16/20 [facility] right ring finger middle phalanx  ?   25-year-old male returns?just over 2 months out?from his surgery. ?He is doing pretty well. ?Pain is essentially gone. ?He is a little stiff?but he is able to play guitar?everyday.??Hes having a?little bit of irritation where his ring finger and the radial border of the small finger.? Overall is pretty happy with his result  Review of Systems  Constitutional:?no fever, fatigue, weakness  Eye:?no vision loss, eye redness, drainage, or pain  ENMT:?no sore throat, ear pain, sinus pain/congestion, nasal congestion/drainage  Respiratory:?no cough, no wheezing, no shortness of breath  Cardiovascular:?no chest pain, no palpitations, no edema  Gastrointestinal:?no nausea, vomiting, or diarrhea. No abdominal pain  Physical Exam  Vitals & Measurements  HR:?77(Peripheral)? BP:?121/85?  HT:?176.00?cm? WT:?74.000?kg? BMI:?23.89?  Right ring finger:  Pin sites have healed. ?He is not tender to palpation over the middle phalanx. ?Sensation is intact to the radial and ulnar borders of the digit. ?Fingers warm and well-perfused.? MCP and PIP motion are full. ?DIP is 0-80?. ?FDS, FDP and extensors intact. ?There?is a slight ulnar deviation deformity?through the old fracture site.??No erythema or drainage. ?No wounds or rashes.  Assessment/Plan  1.?Closed fracture of middle phalanx of ring finger of right hand?S62.854D  ?Overall he is doing pretty well. ?He is lacking the last 10 or so degrees of DIP flexion. ?I think most people this would be an issue but he plays a lot of guitar and he notices it. ?I think itll get better as he continues to?work on motion.? As for the slight amount of ulnar deviation, it is  improved?versus preoperative x-rays. ?There is definitely an?ulnar angulation?but it is minimal. ?I think he will use this over time. ?He is happy with the surgery, he understands that he needs to keep working on his range of motion. ?Told him he can call us anytime if he needs us, but we will not?schedule follow-up.  I was present with the resident during the history and exam.  ???  [x ] I discussed the case with the resident and agree with the findings and plan as documented in the residents note.  [ ] I discussed the case with the resident and agree with the findings and plan as documented in the residents note except:  Orders:   Medications  acetaminophen-hydrocodone 325 mg-5 mg oral tablet, 1 tab(s), Oral, q6hr,? ?Not taking  Diagnostic Results  X-rays of the right hand in clinic today show?healed middle phalanx fracture with no loss of reduction.